# Patient Record
Sex: MALE | Race: WHITE | Employment: OTHER | URBAN - METROPOLITAN AREA
[De-identification: names, ages, dates, MRNs, and addresses within clinical notes are randomized per-mention and may not be internally consistent; named-entity substitution may affect disease eponyms.]

---

## 2022-01-19 ENCOUNTER — ANESTHESIA EVENT (OUTPATIENT)
Dept: OPERATING ROOM | Age: 77
End: 2022-01-19
Payer: MEDICARE

## 2022-01-19 ENCOUNTER — HOSPITAL ENCOUNTER (OUTPATIENT)
Dept: PREADMISSION TESTING | Age: 77
Discharge: HOME | End: 2022-01-23

## 2022-01-19 VITALS — BODY MASS INDEX: 25.33 KG/M2 | WEIGHT: 152 LBS | HEIGHT: 65 IN

## 2022-01-19 RX ORDER — TAMSULOSIN HYDROCHLORIDE 0.4 MG/1
0.4 CAPSULE ORAL DAILY
COMMUNITY

## 2022-01-19 RX ORDER — CIPROFLOXACIN 500 MG/1
500 TABLET, FILM COATED ORAL 2 TIMES DAILY
COMMUNITY

## 2022-01-19 NOTE — PROGRESS NOTES
Pre-op Instructions For Out-Patient Surgery    Medication Instructions:  · Please stop herbs and any supplements now (includes vitamins and minerals). · Please contact your surgeon and prescribing physician for pre-op instructions for any blood thinners. · If you have inhalers/aerosol treatments at home, please use them the morning of your surgery and bring the inhalers with you to the hospital.    · Please take the following medications the morning of your surgery with a sip of water:  NONE. Surgery Instructions:  1. After midnight before surgery:  Do not eat or drink anything, including water, mints, gum, and hard candy. You may brush your teeth without swallowing. No smoking, chewing tobacco, or street drugs. 2. Please shower or bathe before surgery. If you were given Surgical Scrub Chlorhexidine Gluconate Liquid (CHG), please shower the night before and the morning of your surgery following the detailed instructions you received during your pre-admission visit. 3. Please do not wear any cologne, lotion, powder, deodorant, jewelry, piercings, perfume, makeup, nail polish, hair accessories, or hair spray on the day of surgery. Wear loose comfortable clothing. 4. Leave your valuables at home. Bring a storage case for any glasses/contacts. 5. An adult who is responsible for you MUST drive you home and should be with you for the first 24 hours after surgery. 6. If having out-patient knee and foot surgeries, please arrange for planned crutches, walker, or wheelchair before arriving to the hospital.    The Day of Surgery:  · Arrive at [facility] at the time directed by your surgeon and check in at the desk. · If you have a living will or healthcare power of , please bring a copy. · You will be taken to the pre-op holding area where you will be prepared for surgery.   A physical assessment will be performed by a nurse practitioner or house officer. Your IV will be started and you will meet your anesthesiologist.    · When you go to surgery, your family will be directed to the surgical waiting room, where the doctor should speak with them after your surgery. · After surgery, you will be taken to the recovery room then when you are awake and stable you will go to the short stay unit for preparation to be discharged. Only your one designated person is allowed to come to short stay for your discharge. · If you use a Bi-PAP or C-PAP machine, please bring it with you and leave it in the car in case it is needed in recovery room. Instructions read to Tayler and understanding verbalized.

## 2022-01-19 NOTE — PROGRESS NOTES
Dr. Suzy Vazquez, anesthesia, was contacted and informed of patient's history and planned surgery. Orders received and placed in computer under signed and held.

## 2022-01-20 ENCOUNTER — APPOINTMENT (OUTPATIENT)
Dept: GENERAL RADIOLOGY | Age: 77
End: 2022-01-20
Attending: UROLOGY
Payer: MEDICARE

## 2022-01-20 ENCOUNTER — ANESTHESIA (OUTPATIENT)
Dept: OPERATING ROOM | Age: 77
End: 2022-01-20
Payer: MEDICARE

## 2022-01-20 ENCOUNTER — HOSPITAL ENCOUNTER (OUTPATIENT)
Age: 77
Setting detail: OUTPATIENT SURGERY
Discharge: HOME | End: 2022-01-20
Attending: UROLOGY | Admitting: UROLOGY
Payer: MEDICARE

## 2022-01-20 VITALS
OXYGEN SATURATION: 100 % | TEMPERATURE: 97 F | RESPIRATION RATE: 20 BRPM | HEIGHT: 65 IN | DIASTOLIC BLOOD PRESSURE: 84 MMHG | SYSTOLIC BLOOD PRESSURE: 162 MMHG | BODY MASS INDEX: 25.33 KG/M2 | HEART RATE: 51 BPM | WEIGHT: 152 LBS

## 2022-01-20 VITALS
TEMPERATURE: 94 F | RESPIRATION RATE: 2 BRPM | OXYGEN SATURATION: 100 % | DIASTOLIC BLOOD PRESSURE: 76 MMHG | SYSTOLIC BLOOD PRESSURE: 150 MMHG

## 2022-01-20 DIAGNOSIS — N20.1 URETERAL STONE: Primary | ICD-10-CM

## 2022-01-20 LAB
ANION GAP SERPL CALCULATED.3IONS-SCNC: 11 MMOL/L (ref 9–17)
BUN BLDV-MCNC: 13 MG/DL (ref 8–23)
BUN/CREAT BLD: ABNORMAL (ref 9–20)
CALCIUM SERPL-MCNC: 9.8 MG/DL (ref 8.6–10.4)
CHLORIDE BLD-SCNC: 108 MMOL/L (ref 98–107)
CO2: 22 MMOL/L (ref 20–31)
CREAT SERPL-MCNC: 1.04 MG/DL (ref 0.7–1.2)
GFR AFRICAN AMERICAN: >60 ML/MIN
GFR NON-AFRICAN AMERICAN: >60 ML/MIN
GFR SERPL CREATININE-BSD FRML MDRD: ABNORMAL ML/MIN/{1.73_M2}
GFR SERPL CREATININE-BSD FRML MDRD: ABNORMAL ML/MIN/{1.73_M2}
GLUCOSE BLD-MCNC: 101 MG/DL (ref 70–99)
POTASSIUM SERPL-SCNC: 4 MMOL/L (ref 3.7–5.3)
SODIUM BLD-SCNC: 141 MMOL/L (ref 135–144)

## 2022-01-20 PROCEDURE — 7100000001 HC PACU RECOVERY - ADDTL 15 MIN: Performed by: UROLOGY

## 2022-01-20 PROCEDURE — 82365 CALCULUS SPECTROSCOPY: CPT

## 2022-01-20 PROCEDURE — 7100000000 HC PACU RECOVERY - FIRST 15 MIN: Performed by: UROLOGY

## 2022-01-20 PROCEDURE — 3600000003 HC SURGERY LEVEL 3 BASE: Performed by: UROLOGY

## 2022-01-20 PROCEDURE — 7100000030 HC ASPR PHASE II RECOVERY - FIRST 15 MIN: Performed by: UROLOGY

## 2022-01-20 PROCEDURE — C1758 CATHETER, URETERAL: HCPCS | Performed by: UROLOGY

## 2022-01-20 PROCEDURE — 7100000031 HC ASPR PHASE II RECOVERY - ADDTL 15 MIN: Performed by: UROLOGY

## 2022-01-20 PROCEDURE — C1769 GUIDE WIRE: HCPCS | Performed by: UROLOGY

## 2022-01-20 PROCEDURE — 3700000000 HC ANESTHESIA ATTENDED CARE: Performed by: UROLOGY

## 2022-01-20 PROCEDURE — 3600000013 HC SURGERY LEVEL 3 ADDTL 15MIN: Performed by: UROLOGY

## 2022-01-20 PROCEDURE — 2720000010 HC SURG SUPPLY STERILE: Performed by: UROLOGY

## 2022-01-20 PROCEDURE — 6360000002 HC RX W HCPCS: Performed by: ANESTHESIOLOGY

## 2022-01-20 PROCEDURE — 2709999900 HC NON-CHARGEABLE SUPPLY: Performed by: UROLOGY

## 2022-01-20 PROCEDURE — 2580000003 HC RX 258: Performed by: UROLOGY

## 2022-01-20 PROCEDURE — 6360000002 HC RX W HCPCS

## 2022-01-20 PROCEDURE — 36415 COLL VENOUS BLD VENIPUNCTURE: CPT

## 2022-01-20 PROCEDURE — 2500000003 HC RX 250 WO HCPCS

## 2022-01-20 PROCEDURE — 7100000011 HC PHASE II RECOVERY - ADDTL 15 MIN: Performed by: UROLOGY

## 2022-01-20 PROCEDURE — 3700000001 HC ADD 15 MINUTES (ANESTHESIA): Performed by: UROLOGY

## 2022-01-20 PROCEDURE — 80048 BASIC METABOLIC PNL TOTAL CA: CPT

## 2022-01-20 PROCEDURE — 3209999900 FLUORO FOR SURGICAL PROCEDURES

## 2022-01-20 PROCEDURE — 7100000010 HC PHASE II RECOVERY - FIRST 15 MIN: Performed by: UROLOGY

## 2022-01-20 PROCEDURE — C2617 STENT, NON-COR, TEM W/O DEL: HCPCS | Performed by: UROLOGY

## 2022-01-20 PROCEDURE — 6360000002 HC RX W HCPCS: Performed by: UROLOGY

## 2022-01-20 DEVICE — URETERAL STENT
Type: IMPLANTABLE DEVICE | Site: URETER | Status: FUNCTIONAL
Brand: POLARIS™ ULTRA

## 2022-01-20 RX ORDER — METHYLPHENIDATE HYDROCHLORIDE 20 MG/1
20 TABLET ORAL PRN
COMMUNITY

## 2022-01-20 RX ORDER — SODIUM CHLORIDE, SODIUM LACTATE, POTASSIUM CHLORIDE, CALCIUM CHLORIDE 600; 310; 30; 20 MG/100ML; MG/100ML; MG/100ML; MG/100ML
INJECTION, SOLUTION INTRAVENOUS CONTINUOUS
Status: DISCONTINUED | OUTPATIENT
Start: 2022-01-20 | End: 2022-01-20 | Stop reason: HOSPADM

## 2022-01-20 RX ORDER — LABETALOL HYDROCHLORIDE 5 MG/ML
5 INJECTION, SOLUTION INTRAVENOUS EVERY 10 MIN PRN
Status: DISCONTINUED | OUTPATIENT
Start: 2022-01-20 | End: 2022-01-20 | Stop reason: HOSPADM

## 2022-01-20 RX ORDER — OXYCODONE HYDROCHLORIDE AND ACETAMINOPHEN 5; 325 MG/1; MG/1
1 TABLET ORAL PRN
Status: DISCONTINUED | OUTPATIENT
Start: 2022-01-20 | End: 2022-01-20 | Stop reason: HOSPADM

## 2022-01-20 RX ORDER — EPHEDRINE SULFATE/0.9% NACL/PF 50 MG/5 ML
SYRINGE (ML) INTRAVENOUS PRN
Status: DISCONTINUED | OUTPATIENT
Start: 2022-01-20 | End: 2022-01-20 | Stop reason: SDUPTHER

## 2022-01-20 RX ORDER — DEXAMETHASONE SODIUM PHOSPHATE 4 MG/ML
INJECTION, SOLUTION INTRA-ARTICULAR; INTRALESIONAL; INTRAMUSCULAR; INTRAVENOUS; SOFT TISSUE PRN
Status: DISCONTINUED | OUTPATIENT
Start: 2022-01-20 | End: 2022-01-20 | Stop reason: SDUPTHER

## 2022-01-20 RX ORDER — HYDRALAZINE HYDROCHLORIDE 20 MG/ML
10 INJECTION INTRAMUSCULAR; INTRAVENOUS EVERY 10 MIN PRN
Status: DISCONTINUED | OUTPATIENT
Start: 2022-01-20 | End: 2022-01-20 | Stop reason: HOSPADM

## 2022-01-20 RX ORDER — GLYCOPYRROLATE 1 MG/5 ML
SYRINGE (ML) INTRAVENOUS PRN
Status: DISCONTINUED | OUTPATIENT
Start: 2022-01-20 | End: 2022-01-20 | Stop reason: SDUPTHER

## 2022-01-20 RX ORDER — ONDANSETRON 2 MG/ML
4 INJECTION INTRAMUSCULAR; INTRAVENOUS
Status: DISCONTINUED | OUTPATIENT
Start: 2022-01-20 | End: 2022-01-20 | Stop reason: HOSPADM

## 2022-01-20 RX ORDER — IBUPROFEN 800 MG/1
800 TABLET ORAL PRN
COMMUNITY

## 2022-01-20 RX ORDER — LIDOCAINE HYDROCHLORIDE 10 MG/ML
INJECTION, SOLUTION EPIDURAL; INFILTRATION; INTRACAUDAL; PERINEURAL PRN
Status: DISCONTINUED | OUTPATIENT
Start: 2022-01-20 | End: 2022-01-20 | Stop reason: SDUPTHER

## 2022-01-20 RX ORDER — OXYCODONE HYDROCHLORIDE AND ACETAMINOPHEN 5; 325 MG/1; MG/1
2 TABLET ORAL PRN
Status: DISCONTINUED | OUTPATIENT
Start: 2022-01-20 | End: 2022-01-20 | Stop reason: HOSPADM

## 2022-01-20 RX ORDER — MIDAZOLAM HYDROCHLORIDE 1 MG/ML
INJECTION INTRAMUSCULAR; INTRAVENOUS PRN
Status: DISCONTINUED | OUTPATIENT
Start: 2022-01-20 | End: 2022-01-20 | Stop reason: SDUPTHER

## 2022-01-20 RX ORDER — ONDANSETRON 2 MG/ML
INJECTION INTRAMUSCULAR; INTRAVENOUS PRN
Status: DISCONTINUED | OUTPATIENT
Start: 2022-01-20 | End: 2022-01-20 | Stop reason: SDUPTHER

## 2022-01-20 RX ORDER — DIPHENHYDRAMINE HYDROCHLORIDE 50 MG/ML
12.5 INJECTION INTRAMUSCULAR; INTRAVENOUS
Status: DISCONTINUED | OUTPATIENT
Start: 2022-01-20 | End: 2022-01-20 | Stop reason: HOSPADM

## 2022-01-20 RX ORDER — PROPOFOL 10 MG/ML
INJECTION, EMULSION INTRAVENOUS PRN
Status: DISCONTINUED | OUTPATIENT
Start: 2022-01-20 | End: 2022-01-20 | Stop reason: SDUPTHER

## 2022-01-20 RX ORDER — HYDROCODONE BITARTRATE AND ACETAMINOPHEN 5; 325 MG/1; MG/1
1 TABLET ORAL EVERY 6 HOURS PRN
Qty: 8 TABLET | Refills: 0 | Status: SHIPPED | OUTPATIENT
Start: 2022-01-20 | End: 2022-01-23

## 2022-01-20 RX ORDER — FENTANYL CITRATE 50 UG/ML
INJECTION, SOLUTION INTRAMUSCULAR; INTRAVENOUS PRN
Status: DISCONTINUED | OUTPATIENT
Start: 2022-01-20 | End: 2022-01-20 | Stop reason: SDUPTHER

## 2022-01-20 RX ADMIN — MIDAZOLAM 2 MG: 1 INJECTION INTRAMUSCULAR; INTRAVENOUS at 13:37

## 2022-01-20 RX ADMIN — SODIUM CHLORIDE, POTASSIUM CHLORIDE, SODIUM LACTATE AND CALCIUM CHLORIDE: 600; 310; 30; 20 INJECTION, SOLUTION INTRAVENOUS at 11:55

## 2022-01-20 RX ADMIN — HYDRALAZINE HYDROCHLORIDE 10 MG: 20 INJECTION INTRAMUSCULAR; INTRAVENOUS at 15:26

## 2022-01-20 RX ADMIN — PROPOFOL 170 MG: 10 INJECTION, EMULSION INTRAVENOUS at 13:41

## 2022-01-20 RX ADMIN — Medication 0.2 MG: at 14:04

## 2022-01-20 RX ADMIN — CEFAZOLIN 2000 MG: 10 INJECTION, POWDER, FOR SOLUTION INTRAVENOUS at 13:48

## 2022-01-20 RX ADMIN — Medication 10 MG: at 14:12

## 2022-01-20 RX ADMIN — ONDANSETRON 4 MG: 2 INJECTION INTRAMUSCULAR; INTRAVENOUS at 13:46

## 2022-01-20 RX ADMIN — FENTANYL CITRATE 50 MCG: 50 INJECTION, SOLUTION INTRAMUSCULAR; INTRAVENOUS at 13:58

## 2022-01-20 RX ADMIN — DEXAMETHASONE SODIUM PHOSPHATE 4 MG: 4 INJECTION, SOLUTION INTRAMUSCULAR; INTRAVENOUS at 13:46

## 2022-01-20 RX ADMIN — SODIUM CHLORIDE, POTASSIUM CHLORIDE, SODIUM LACTATE AND CALCIUM CHLORIDE: 600; 310; 30; 20 INJECTION, SOLUTION INTRAVENOUS at 15:35

## 2022-01-20 RX ADMIN — LIDOCAINE HYDROCHLORIDE 50 MG: 10 INJECTION, SOLUTION EPIDURAL; INFILTRATION; INTRACAUDAL; PERINEURAL at 13:41

## 2022-01-20 ASSESSMENT — PULMONARY FUNCTION TESTS
PIF_VALUE: 12
PIF_VALUE: 26
PIF_VALUE: 2
PIF_VALUE: 12
PIF_VALUE: 0
PIF_VALUE: 12
PIF_VALUE: 12
PIF_VALUE: 1
PIF_VALUE: 0
PIF_VALUE: 0
PIF_VALUE: 3
PIF_VALUE: 12
PIF_VALUE: 2
PIF_VALUE: 2
PIF_VALUE: 12
PIF_VALUE: 12
PIF_VALUE: 4
PIF_VALUE: 12
PIF_VALUE: 0
PIF_VALUE: 12
PIF_VALUE: 2
PIF_VALUE: 12
PIF_VALUE: 1
PIF_VALUE: 12
PIF_VALUE: 10
PIF_VALUE: 12
PIF_VALUE: 12
PIF_VALUE: 2
PIF_VALUE: 12
PIF_VALUE: 10
PIF_VALUE: 12
PIF_VALUE: 12

## 2022-01-20 ASSESSMENT — PAIN SCALES - GENERAL
PAINLEVEL_OUTOF10: 0
PAINLEVEL_OUTOF10: 0

## 2022-01-20 ASSESSMENT — ENCOUNTER SYMPTOMS
SORE THROAT: 0
VOMITING: 0
DIARRHEA: 0
CONSTIPATION: 0
EYE PAIN: 0
ABDOMINAL PAIN: 1
RHINORRHEA: 0
SHORTNESS OF BREATH: 0
WHEEZING: 0
TROUBLE SWALLOWING: 0
COUGH: 0
BLOOD IN STOOL: 0
NAUSEA: 0

## 2022-01-20 ASSESSMENT — PAIN - FUNCTIONAL ASSESSMENT: PAIN_FUNCTIONAL_ASSESSMENT: 0-10

## 2022-01-20 ASSESSMENT — PAIN DESCRIPTION - DESCRIPTORS: DESCRIPTORS: PRESSURE

## 2022-01-20 NOTE — BRIEF OP NOTE
Brief Postoperative Note      Patient: Omar Topete  YOB: 1945  MRN: [id number]    Date of Procedure: 1/20/2022    Pre-Op Diagnosis: RIGHT KIDNEY STONE  PT VACCINATED    Post-Op Diagnosis: Same       Procedure(s):  CYSTOSCOPY URETEROSCOPY HOLMIUM LASER LITHO WITH STENT INSERTION    Surgeon(s):  Brenda Sutton MD    Assistant:  * No surgical staff found *    Anesthesia: General    Estimated Blood Loss (mL): Minimal    Complications: None    Specimens:   ID Type Source Tests Collected by Time Destination   1 : BLADDER AND URETERAL STONES Stone (Calculus) Bladder STONE ANALYSIS Brenda Sutton MD 1/20/2022 1410        Implants:  Implant Name Type Inv. Item Serial No.  Lot No. LRB No. Used Action   STENT URET 6FR L26CM PERCFLX HYDR+ DBL PGTL THRD 2  STENT URET 6FR L26CM PERCFLX HYDR+ DBL PGTL THRD 2  [employer] [facility]- [id number] Right 1 Implanted         Drains: * No LDAs found *    Findings: right ureteral stone fragmented, bladder stones removed. Stent placed.      Electronically signed by Brenda Sutton MD on 1/20/2022 at 2:19 PM

## 2022-01-20 NOTE — ANESTHESIA PRE PROCEDURE
Department of Anesthesiology  Preprocedure Note       Name:  Mingo Ellis   Age:  68 y.o.  :  1945                                          MRN:  [id number]         Date:  2022      Surgeon: Marielos Lopez):  Darlene Rivas MD    Procedure: Procedure(s):  CYSTOSCOPY URETEROSCOPY HOLMIUM LASER LITHO WITH STENT INSERTION    Medications prior to admission:   Prior to Admission medications    Medication Sig Start Date End Date Taking? Authorizing Provider   ciprofloxacin (CIPRO) 500 MG tablet Take 500 mg by mouth 2 times daily   Yes Historical Provider, MD   tamsulosin (FLOMAX) 0.4 MG capsule Take 0.4 mg by mouth daily   Yes Historical Provider, MD   methylphenidate (RITALIN) 20 MG tablet Take 20 mg by mouth as needed. Historical Provider, MD   ibuprofen (ADVIL;MOTRIN) 800 MG tablet Take 800 mg by mouth as needed    Historical Provider, MD       Current medications:    No current facility-administered medications for this encounter. Allergies:  No Known Allergies    Problem List:  There is no problem list on file for this patient. Past Medical History:        Diagnosis Date    Chest pain     Possible pericarditis in the past    Erectile dysfunction     Hyperlipidemia     no meds at this time. Follows plant based diet.  Kidney stones     Narcolepsy     Prolonged emergence from general anesthesia     RELATES WIFE told him he was mean and nasty after waking up       Past Surgical History:        Procedure Laterality Date    CYSTOSCOPY      HERNIA REPAIR      rt. inguinal hernia repair.  KIDNEY STONE SURGERY Left     Stent placed/removed- left side    TONSILLECTOMY      11years old       Social History:    Social History     Tobacco Use    Smoking status: Former Smoker    Smokeless tobacco: Never Used    Tobacco comment: QUIT 27 YRS. AGO- smoked 1 PPD for about 20 years prior to quitting   Substance Use Topics    Alcohol use:  Yes     Alcohol/week: 7.0 - 14.0 standard drinks Types: 7 - 14 Glasses of wine per week     Comment: SOCIALLY                                 Counseling given: Not Answered  Comment: QUIT 30 YRS. AGO- smoked 1 PPD for about 20 years prior to quitting      Vital Signs (Current):   Vitals:    01/20/22 1045 01/20/22 1102   BP:  (!) 189/83   Pulse: 60 50   Resp:  18   Temp:  96.9 °F (36.1 °C)   TempSrc:  Infrared   SpO2:  97%   Weight:  152 lb (68.9 kg)   Height:  5' 5\" (1.651 m)                                              BP Readings from Last 3 Encounters:   01/20/22 (!) 189/83       NPO Status:                                                                                 BMI:   Wt Readings from Last 3 Encounters:   01/20/22 152 lb (68.9 kg)   01/19/22 152 lb (68.9 kg)     Body mass index is 25.29 kg/m². CBC: No results found for: WBC, RBC, HGB, HCT, MCV, RDW, PLT    CMP: No results found for: NA, K, CL, CO2, BUN, CREATININE, GFRAA, AGRATIO, LABGLOM, GLUCOSE, GLU, PROT, CALCIUM, BILITOT, ALKPHOS, AST, ALT    POC Tests: No results for input(s): POCGLU, POCNA, POCK, POCCL, POCBUN, POCHEMO, POCHCT in the last 72 hours. Coags: No results found for: PROTIME, INR, APTT    HCG (If Applicable): No results found for: PREGTESTUR, PREGSERUM, HCG, HCGQUANT     ABGs: No results found for: PHART, PO2ART, RKZ0HBT, FKU4YMB, BEART, K8KQOEJB     Type & Screen (If Applicable):  No results found for: LABABO, LABRH    Drug/Infectious Status (If Applicable):  No results found for: HIV, HEPCAB    COVID-19 Screening (If Applicable): No results found for: COVID19        Anesthesia Evaluation  Patient summary reviewed and Nursing notes reviewed history of anesthetic complications (Prolonged emergence from general anesthesia):    Airway: Mallampati: II  TM distance: >3 FB   Neck ROM: full  Mouth opening: > = 3 FB Dental: normal exam         Pulmonary:Negative Pulmonary ROS and normal exam  breath sounds clear to auscultation                             Cardiovascular:Negative CV ROS            Rhythm: regular  Rate: normal                    Neuro/Psych:   Negative Neuro/Psych ROS              GI/Hepatic/Renal:   (+) renal disease: kidney stones,           Endo/Other: Negative Endo/Other ROS                    Abdominal:             Vascular: negative vascular ROS. Other Findings:           Anesthesia Plan      general     ASA 3       Induction: intravenous. MIPS: Postoperative opioids intended and Prophylactic antiemetics administered. Anesthetic plan and risks discussed with patient. Plan discussed with CRNA.                   Arturo Contreras MD   1/20/2022

## 2022-01-20 NOTE — H&P
HISTORY and [facility] [address]       NAME:  Siri Martinez  MRN: [id number]   YOB: 1945   Date: 1/20/2022   Age: 68 y.o. Gender: male     COMPLAINT AND PRESENT HISTORY:   Siri Martinez is 68 y.o.,  male, undergoing CYSTOSCOPY URETEROSCOPY HOLMIUM LASER LITHO WITH STENT INSERTION- RIGHT for RIGHT KIDNEY STONE. Patient states about 7 days ago he started to notice some hematuria, as well as RLQ pain (similar of previous symptoms r/t kidney stones). He states that pain became more intense, he did go to [place] [address] ED 1-15-21, CT scan was completed which did show 5 mm mid-ureter calculus on right side (per patient). He states pain had improved on its own, also received IV Toradol, advised to f/u with Dr. Helen Pugh. He was started on Flomax in ED. States some white cells were shown in UA, culture ordered- was told some E. Coli was shown, started on Cipro per Dr. Helen Pugh (started about 3 days ago). He states until last night, he wasn't having symptoms, now he does feel some sensation to urinate, although he does not have to urinate. Denies flank pain, he does admit to some suprapubic discomfort/irritability. Denies dysuria. Denies fever/chills. Denies N&V. He does still note intermittent hematuria, states urine is dark (not grossly). States last kidney stone was about 8 years ago, states it was 9 mm stone, states lodged at [employer]- states w/cystoscopy there was too many spasms, had stent placed- then lasered. Review of additional significant medical hx:  HLD, ELEVATED BP: Denies current chest pain, palpitations, SOB, dizziness, leg swelling, headache. BP is elevated on admission today, he states he has not been formally dx'd with HTN, but his BP was also high during recent ED visit. Current medications r/t condition: None. NARCOLEPSY: Takes Ritalin PRN. LOW PLATELET COUNT: He states this has been noted in the past- PCP is monitoring.  Noted last platelet [facility] 120 (9-26-17). NPO status: Patient states they have been NPO since before midnight. Medications taken TODAY (with sip of water): None. Took Cipro last night. Anticoagulation status: Patient denies taking any anti-coagulants, including aspirin currently. Has not taken IBU in 2 days. Denies personal hx of blood clots. Denies personal hx of MRSA infection. Denies any personal or family hx of previous complications w/anesthesia except for prolonged emergence from anesthesia/patient woke up irritable in the past.  Nicotine status: Former. PAST MEDICAL HISTORY     Past Medical History:   Diagnosis Date    Chest pain     Possible pericarditis in the past    Decreased platelet count (HCC)     Elevated blood-pressure reading without diagnosis of hypertension     Erectile dysfunction     Hyperlipidemia     no meds at this time. Follows plant based diet.  Kidney stones     Narcolepsy     Prolonged emergence from general anesthesia     RELATES WIFE told him he was mean and nasty after waking up       SURGICAL HISTORY       Past Surgical History:   Procedure Laterality Date    CYSTOSCOPY      HERNIA REPAIR      rt. inguinal hernia repair.  KIDNEY STONE SURGERY Left     Stent placed/removed- left side    TONSILLECTOMY      11years old    VASECTOMY         SOCIAL HISTORY       Social History     Socioeconomic History    Marital status:      Spouse name: None    Number of children: None    Years of education: None    Highest education level: None   Occupational History    None   Tobacco Use    Smoking status: Former Smoker    Smokeless tobacco: Never Used    Tobacco comment: QUIT [address] AGO- smoked 1 PPD for about 20 years prior to quitting   Vaping Use    Vaping Use: Never used   Substance and Sexual Activity    Alcohol use:  Yes     Alcohol/week: 7.0 - 14.0 standard drinks     Types: 7 - 14 Glasses of wine per week     Comment: SOCIALLY  Drug use: Never    Sexual activity: None   Other Topics Concern    None   Social History Narrative    None     Social Determinants of Health     Financial Resource Strain:     Difficulty of Paying Living Expenses: Not on file   Food Insecurity:     Worried About Running Out of Food in the Last Year: Not on file    Giovani of Food in the Last Year: Not on file   Transportation Needs:     Lack of Transportation (Medical): Not on file    Lack of Transportation (Non-Medical): Not on file   Physical Activity:     Days of Exercise per Week: Not on file    Minutes of Exercise per Session: Not on file   Stress:     Feeling of Stress : Not on file   Social Connections:     Frequency of Communication with Friends and Family: Not on file    Frequency of Social Gatherings with Friends and Family: Not on file    Attends [employer] Services: Not on file    Active Member of [address] [place] [address] [employer] or Organizations: Not on file    Attends Club or Organization Meetings: Not on file    Marital Status: Not on file   Intimate Partner Violence:     Fear of Current or Ex-Partner: Not on file    Emotionally Abused: Not on file    Physically Abused: Not on file    Sexually Abused: Not on file   Housing Stability:     Unable to Pay for Housing in the Last Year: Not on file    Number of [place] in the Last Year: Not on file    Unstable Housing in the Last Year: Not on file       REVIEW OF SYSTEMS    No Known Allergies    No current facility-administered medications on file prior to encounter. Current Outpatient Medications on File Prior to Encounter   Medication Sig Dispense Refill    methylphenidate (RITALIN) 20 MG tablet Take 20 mg by mouth as needed.       ciprofloxacin (CIPRO) 500 MG tablet Take 500 mg by mouth 2 times daily      tamsulosin (FLOMAX) 0.4 MG capsule Take 0.4 mg by mouth daily      ibuprofen (ADVIL;MOTRIN) 800 MG tablet Take 800 mg by mouth as needed       Review of Systems   Constitutional: Negative for chills and fever. HENT: Negative for congestion, ear pain, rhinorrhea, sore throat and trouble swallowing. Eyes: Negative for pain. Respiratory: Negative for cough, shortness of breath and wheezing. Cardiovascular: Negative for chest pain, palpitations and leg swelling. Gastrointestinal: Positive for abdominal pain. Negative for blood in stool, constipation, diarrhea, nausea and vomiting. Genitourinary:        SEE HPI. Neurological: Negative for dizziness and headaches. Hematological: Does not bruise/bleed easily. GENERAL PHYSICAL EXAM     Vitals: Review current vital signs per RN flow sheet. BP (!) 186/87   Pulse 50   Temp 96.9 °F (36.1 °C) (Infrared)   Resp 18   Ht 5' 5\" (1.651 m)   Wt 152 lb (68.9 kg)   SpO2 97%   BMI 25.29 kg/m²     GENERAL APPEARANCE:   Freida Alvarado is 68 y.o.,  male, not obese, nourished, conscious, alert. Does not appear to be in any distress or pain at this time. Physical Exam  Constitutional:       General: He is not in acute distress. Appearance: He is well-developed. He is not ill-appearing, toxic-appearing or diaphoretic. HENT:      Head: Normocephalic. Right Ear: External ear normal.      Left Ear: External ear normal.      Nose: Nose normal.      Mouth/Throat:      Pharynx: No oropharyngeal exudate or posterior oropharyngeal erythema. Tonsils: No tonsillar abscesses. Eyes:      General:         Right eye: No discharge. Left eye: No discharge. Conjunctiva/sclera:      Right eye: Right conjunctiva is injected (Slightly). Left eye: Left conjunctiva is injected (Slightly). Pupils: Pupils are equal, round, and reactive to light. Cardiovascular:      Rate and Rhythm: Normal rate and regular rhythm. Pulses: Intact distal pulses. Heart sounds: Normal heart sounds. Pulmonary:      Effort: Pulmonary effort is normal. No accessory muscle usage or respiratory distress.       Breath sounds: Normal breath sounds. No decreased breath sounds, wheezing, rhonchi or rales. Abdominal:      General: Bowel sounds are normal. There is no distension. Palpations: Abdomen is soft. There is no mass. Tenderness: There is no abdominal tenderness. There is no right CVA tenderness, left CVA tenderness, guarding or rebound. Musculoskeletal:      Right lower leg: No swelling or tenderness. Left lower leg: No swelling or tenderness. Comments: Negative Lian's sign b/l. Skin:     General: Skin is warm and dry. Neurological:      Mental Status: He is alert and oriented to person, place, and time. Psychiatric:         Behavior: Behavior normal.         RECENT LAB WORK     Lab Results   Component Value Date     01/20/2022    K 4.0 01/20/2022     (H) 01/20/2022    CO2 22 01/20/2022    BUN 13 01/20/2022    CREATININE 1.04 01/20/2022    GLUCOSE 101 (H) 01/20/2022    CALCIUM 9.8 01/20/2022    LABGLOM >60 01/20/2022    GFRAA >60 01/20/2022     No results found for: WBC, HGB, HCT, MCV, PLT     Noted last platelet count per [facility] was 120 (9-26-17). PROVISIONAL DIAGNOSES / SURGERY:      RIGHT KIDNEY STONE    CYSTOSCOPY URETEROSCOPY HOLMIUM LASER LITHO WITH STENT INSERTION- RIGHT     There are no problems to display for this patient. Discussed elevated BP and hx of low platelet count w/Dr. Madhu Diaz.     Jone Rm, APRN - CNP on 1/20/2022 at 12:10 PM

## 2022-01-20 NOTE — PROGRESS NOTES
1655 after removing IV and starting to move about in cubicle a wave of nausea and dry heaves swept over patient. Encouraged patient to rest, deep breathing, cool wrap applied to nape of neck and forehead. Wife attentive and knowledgeable. 1720 feeling better.  Dressed,ready to travel home

## 2022-01-20 NOTE — ANESTHESIA POSTPROCEDURE EVALUATION
Department of Anesthesiology  Postprocedure Note    Patient: Derek Kwong  MRN: [id number]  YOB: 1945  Date of evaluation: 1/20/2022  Time:  6:16 PM     Procedure Summary     Date: 01/20/22 Room / Location: [address] [place] [address] [address]    Anesthesia Start: 9093 Anesthesia Stop: 9868    Procedure: CYSTOSCOPY URETEROSCOPY HOLMIUM LASER LITHO WITH STENT INSERTION (Right Ureter) Diagnosis:       (RIGHT KIDNEY STONE)      (PT VACCINATED)    Surgeons: Beto Richards MD Responsible Provider: Manju Zapata MD    Anesthesia Type: general ASA Status: 3          Anesthesia Type: general    Giovany Phase I: Giovany Score: 10    Giovany Phase II: Giovany Score: 10    Last vitals: Reviewed and per EMR flowsheets.        Anesthesia Post Evaluation    Comments: POST- ANESTHESIA EVALUATION       Pt Name: Derek Kwong  MRN: [id number]  YOB: 1945  Date of evaluation: 1/20/2022  Time:  6:16 PM      BP (!) 162/84   Pulse 51   Temp 97 °F (36.1 °C) (Infrared)   Resp 20   Ht 5' 5\" (1.651 m)   Wt 152 lb (68.9 kg)   SpO2 100%   BMI 25.29 kg/m²      Consciousness Level  Awake  Cardiopulmonary Status  Stable  Pain Adequately Treated YES  Nausea / Vomiting  NO  Adequate Hydration  YES  Anesthesia Related Complications NONE      Electronically signed by Manju Zapata MD on 1/20/2022 at 6:16 PM

## 2022-01-22 NOTE — OP NOTE
[address]                 [address] [facility], [address]                                OPERATIVE REPORT    PATIENT NAME: Kenan Kathleen                    :        1945  MED REC NO:   [id number]                              ROOM:  ACCOUNT NO:   [de-identified]                           ADMIT DATE: 2022  PROVIDER:     Jacinto Mabry    DATE OF PROCEDURE:  2022    PREOPERATIVE DIAGNOSES:  Right ureteral stone and bladder stones. POSTOPERATIVE DIAGNOSES:  Right ureteral stone and bladder stones. PROCEDURE PERFORMED:  1. Cystoscopy with evacuation of bladder calculi. 2.  Right ureteroscopy, holmium laser lithotripsy, and right ureteral  stent placement. SURGEON:  Jacinto Mabry MD    ANESTHESIA:  General.    COMPLICATIONS:  None. BLEEDING:  None. SPECIMEN:  Bladder and ureteral stones. DRAINS:  A 6 x 26 right ureteral stent. HISTORY OF PRESENT ILLNESS:  The patient is a 59-year-old male who was  seen at [facility] for severe flank pain. He had a CT scan  done which demonstrated a 5 mm right ureteral stone as well as some  small bladder stones. He attempted a trial of stone passage but was  unable to pass the stone, and he is here today for definitive stone  therapy. He did have a positive urine culture preoperatively that was  treated with culture specific antibiotics. PROCEDURE IN DETAIL:  The patient was brought back to the operating room  and laid on the operating table in supine position. Once general  anesthesia was obtained, he was placed in the dorsal lithotomy position,  and prepped and draped in the usual sterile fashion. A time-out was  performed. He was properly identified. Antibiotics were administered  based on the previous culture. The cystoscope was inserted through the  urethra into the bladder. Upon entering into the bladder, 3 small  bladder stones were seen.   These were evacuated without any difficulty  and collected, and sent for analysis. The remainder of the bladder was  carefully visualized and was unremarkable. The right ureteral orifice  was identified. A wire was advanced up the right ureteral orifice into  the ureter and collecting system. A dual-lumen catheter was advanced  over the wire and a second wire advanced to the dual-lumen catheter as  well into the collecting system. The position of the wires was  confirmed with fluoroscopy. At that point, the flexible ureteroscope  was then advanced over the working wire into the distal to mid ureter  where the stone was readily identified. The working wire was removed. The safety wire remained in place. A 272 micron laser fiber was then  used to fragment the stone into multiple pieces. All of these pieces  were evacuated into the bladder without any difficulty. The remainder  of the ureter was visualized up to the level of the ureteropelvic  junction and into the collecting system which was all unremarkable. No  stones were seen. The ureteroscope was pulled back. No stones were  seen in the ureter. The cystoscope was back loaded over the wire. A 6  x 26 stent was then advanced over the wire into the ureter and  collecting system. A good proximal curl was seen in the renal pelvis  and a good distal curl was seen in the bladder. Strings were left on  the stent. He awoke from anesthesia without any complications and was  taken back to postoperative anesthesia care in good condition. He is to  remove the stent in 72 hours. We will follow up as an outpatient in  roughly 2 months with a 24-hour urine.         Sandip Barrera    D: 01/21/2022 14:02:21       T: 01/21/2022 14:05:06     [email or web address]  Job#: [id number]     Doc#: [id number]    CC:

## 2022-01-24 LAB
STONE COMPOSITION: NORMAL
STONE DESCRIPTION: NORMAL
STONE MASS: 26 MG

## 2022-01-31 NOTE — OP NOTE
[address]                 [facility], [address]                                OPERATIVE REPORT    PATIENT NAME: María Elena Pineda                    :        1945  MED REC NO:   [id number]                              ROOM:  ACCOUNT NO:   [de-identified]                           ADMIT DATE: 2022  PROVIDER:     Darlene Rivas    Corrected Acct # (2022 am)    DATE OF PROCEDURE:  2022    PREOPERATIVE DIAGNOSES:  Right ureteral stone and bladder stones. POSTOPERATIVE DIAGNOSES:  Right ureteral stone and bladder stones. PROCEDURE PERFORMED:  1. Cystoscopy with evacuation of bladder calculi. 2.  Right ureteroscopy, holmium laser lithotripsy, and right ureteral  stent placement. SURGEON:  Darlene Rivas MD    ANESTHESIA:  General.    COMPLICATIONS:  None. BLEEDING:  None. SPECIMEN:  Bladder and ureteral stones. DRAINS:  A 6 x 26 right ureteral stent. HISTORY OF PRESENT ILLNESS:  The patient is a 59-year-old male who was  seen at [address] [place] [address] for severe flank pain. He had a CT scan  done which demonstrated a 5 mm right ureteral stone as well as some  small bladder stones. He attempted a trial of stone passage but was  unable to pass the stone, and he is here today for definitive stone  therapy. He did have a positive urine culture preoperatively that was  treated with culture specific antibiotics. PROCEDURE IN DETAIL:  The patient was brought back to the operating room  and laid on the operating table in supine position. Once general  anesthesia was obtained, he was placed in the dorsal lithotomy position,  and prepped and draped in the usual sterile fashion. A time-out was  performed. He was properly identified. Antibiotics were administered  based on the previous culture. The cystoscope was inserted through the  urethra into the bladder.   Upon entering into the bladder, 3 small  bladder stones were seen.  These were evacuated without any difficulty  and collected, and sent for analysis. The remainder of the bladder was  carefully visualized and was unremarkable. The right ureteral orifice  was identified. A wire was advanced up the right ureteral orifice into  the ureter and collecting system. A dual-lumen catheter was advanced  over the wire and a second wire advanced to the dual-lumen catheter as  well into the collecting system. The position of the wires was  confirmed with fluoroscopy. At that point, the flexible ureteroscope  was then advanced over the working wire into the distal to mid ureter  where the stone was readily identified. The working wire was removed. The safety wire remained in place. A 272 micron laser fiber was then  used to fragment the stone into multiple pieces. All of these pieces  were evacuated into the bladder without any difficulty. The remainder  of the ureter was visualized up to the level of the ureteropelvic  junction and into the collecting system which was all unremarkable. No  stones were seen. The ureteroscope was pulled back. No stones were  seen in the ureter. The cystoscope was back loaded over the wire. A 6  x 26 stent was then advanced over the wire into the ureter and  collecting system. A good proximal curl was seen in the renal pelvis  and a good distal curl was seen in the bladder. Strings were left on  the stent. He awoke from anesthesia without any complications and was  taken back to postoperative anesthesia care in good condition. He is to  remove the stent in 72 hours. We will follow up as an outpatient in  roughly 2 months with a 24-hour urine.         Bonita Harper    D: 01/21/2022 14:02:21       T: 01/21/2022 14:05:06     [email or web address]  Job#: [id number]     Doc#: [id number]    CC:

## 2022-09-19 ENCOUNTER — HOSPITAL ENCOUNTER (OUTPATIENT)
Age: 77
Discharge: HOME | End: 2022-09-21
Payer: MEDICARE

## 2022-09-19 ENCOUNTER — HOSPITAL ENCOUNTER (OUTPATIENT)
Dept: GENERAL RADIOLOGY | Age: 77
Discharge: HOME | End: 2022-09-21
Payer: MEDICARE

## 2022-09-19 DIAGNOSIS — N20.0 CALCULUS OF KIDNEY: ICD-10-CM

## 2022-09-19 PROCEDURE — 74018 RADEX ABDOMEN 1 VIEW: CPT

## (undated) DEVICE — STRIP,CLOSURE,WOUND,MEDI-STRIP,1/2X4: Brand: MEDLINE

## (undated) DEVICE — GUIDEWIRE URO L150CM DIA0.035IN STIFF NIT HYDRPHLC STR TIP

## (undated) DEVICE — FIBER LASER EXCALIBUR HOLM

## (undated) DEVICE — 3M™ STERI-STRIP™ COMPOUND BENZOIN TINCTURE 40 BAGS/CARTON 4 CARTONS/CASE C1544: Brand: 3M™ STERI-STRIP™

## (undated) DEVICE — SOLUTION IRRIG 1000ML STRL H2O USP PLAS POUR BTL

## (undated) DEVICE — DUAL LUMEN URETERAL CATHETER

## (undated) DEVICE — ADAPTER URO SCP UROLOK LL

## (undated) DEVICE — SOLUTION IRRIG 3000ML 0.9% SOD CHL USP UROMATIC PLAS CONT

## (undated) DEVICE — ST CHARLES CYSTO PACK: Brand: MEDLINE INDUSTRIES, INC.

## (undated) DEVICE — GLOVE ORANGE PI 7 1/2   MSG9075

## (undated) DEVICE — SINGLE ACTION PUMPING SYSTEM